# Patient Record
Sex: MALE | Race: WHITE | NOT HISPANIC OR LATINO | Employment: UNEMPLOYED | URBAN - METROPOLITAN AREA
[De-identification: names, ages, dates, MRNs, and addresses within clinical notes are randomized per-mention and may not be internally consistent; named-entity substitution may affect disease eponyms.]

---

## 2017-10-03 ENCOUNTER — HOSPITAL ENCOUNTER (INPATIENT)
Facility: MEDICAL CENTER | Age: 47
LOS: 1 days | DRG: 917 | End: 2017-10-05
Attending: EMERGENCY MEDICINE | Admitting: HOSPITALIST
Payer: MEDICAID

## 2017-10-03 DIAGNOSIS — F99 PSYCHIATRIC DISORDER: ICD-10-CM

## 2017-10-03 DIAGNOSIS — F15.10 METHAMPHETAMINE ABUSE (HCC): ICD-10-CM

## 2017-10-03 DIAGNOSIS — R41.0 DELIRIUM: ICD-10-CM

## 2017-10-03 LAB
ALBUMIN SERPL BCP-MCNC: 3.7 G/DL (ref 3.2–4.9)
ALBUMIN/GLOB SERPL: 1.2 G/DL
ALP SERPL-CCNC: 58 U/L (ref 30–99)
ALT SERPL-CCNC: 33 U/L (ref 2–50)
AMPHET UR QL SCN: POSITIVE
ANION GAP SERPL CALC-SCNC: 8 MMOL/L (ref 0–11.9)
APAP SERPL-MCNC: <10 UG/ML (ref 10–30)
APPEARANCE UR: CLEAR
AST SERPL-CCNC: 43 U/L (ref 12–45)
BARBITURATES UR QL SCN: NEGATIVE
BASOPHILS # BLD AUTO: 0.4 % (ref 0–1.8)
BASOPHILS # BLD: 0.03 K/UL (ref 0–0.12)
BENZODIAZ UR QL SCN: POSITIVE
BILIRUB SERPL-MCNC: 1 MG/DL (ref 0.1–1.5)
BILIRUB UR QL STRIP.AUTO: NEGATIVE
BUN SERPL-MCNC: 19 MG/DL (ref 8–22)
BZE UR QL SCN: NEGATIVE
CALCIUM SERPL-MCNC: 8.6 MG/DL (ref 8.5–10.5)
CANNABINOIDS UR QL SCN: NEGATIVE
CHLORIDE SERPL-SCNC: 102 MMOL/L (ref 96–112)
CK SERPL-CCNC: 628 U/L (ref 0–154)
CO2 SERPL-SCNC: 27 MMOL/L (ref 20–33)
COLOR UR: YELLOW
CREAT SERPL-MCNC: 1.06 MG/DL (ref 0.5–1.4)
EOSINOPHIL # BLD AUTO: 0.17 K/UL (ref 0–0.51)
EOSINOPHIL NFR BLD: 2.1 % (ref 0–6.9)
ERYTHROCYTE [DISTWIDTH] IN BLOOD BY AUTOMATED COUNT: 39.4 FL (ref 35.9–50)
ETHANOL BLD-MCNC: 0 G/DL
GFR SERPL CREATININE-BSD FRML MDRD: >60 ML/MIN/1.73 M 2
GLOBULIN SER CALC-MCNC: 3.2 G/DL (ref 1.9–3.5)
GLUCOSE SERPL-MCNC: 106 MG/DL (ref 65–99)
GLUCOSE UR STRIP.AUTO-MCNC: NEGATIVE MG/DL
HCT VFR BLD AUTO: 34 % (ref 42–52)
HGB BLD-MCNC: 12.5 G/DL (ref 14–18)
IMM GRANULOCYTES # BLD AUTO: 0.03 K/UL (ref 0–0.11)
IMM GRANULOCYTES NFR BLD AUTO: 0.4 % (ref 0–0.9)
KETONES UR STRIP.AUTO-MCNC: NEGATIVE MG/DL
LEUKOCYTE ESTERASE UR QL STRIP.AUTO: NEGATIVE
LYMPHOCYTES # BLD AUTO: 1.93 K/UL (ref 1–4.8)
LYMPHOCYTES NFR BLD: 23.8 % (ref 22–41)
MCH RBC QN AUTO: 32.1 PG (ref 27–33)
MCHC RBC AUTO-ENTMCNC: 36.8 G/DL (ref 33.7–35.3)
MCV RBC AUTO: 87.4 FL (ref 81.4–97.8)
METHADONE UR QL SCN: NEGATIVE
MICRO URNS: NORMAL
MONOCYTES # BLD AUTO: 0.97 K/UL (ref 0–0.85)
MONOCYTES NFR BLD AUTO: 11.9 % (ref 0–13.4)
NEUTROPHILS # BLD AUTO: 4.99 K/UL (ref 1.82–7.42)
NEUTROPHILS NFR BLD: 61.4 % (ref 44–72)
NITRITE UR QL STRIP.AUTO: NEGATIVE
NRBC # BLD AUTO: 0 K/UL
NRBC BLD AUTO-RTO: 0 /100 WBC
OPIATES UR QL SCN: NEGATIVE
OXYCODONE UR QL SCN: NEGATIVE
PCP UR QL SCN: NEGATIVE
PH UR STRIP.AUTO: 6 [PH]
PLATELET # BLD AUTO: 253 K/UL (ref 164–446)
PMV BLD AUTO: 9.1 FL (ref 9–12.9)
POTASSIUM SERPL-SCNC: 3.3 MMOL/L (ref 3.6–5.5)
PROPOXYPH UR QL SCN: NEGATIVE
PROT SERPL-MCNC: 6.9 G/DL (ref 6–8.2)
PROT UR QL STRIP: NEGATIVE MG/DL
RBC # BLD AUTO: 3.89 M/UL (ref 4.7–6.1)
RBC UR QL AUTO: NEGATIVE
SALICYLATES SERPL-MCNC: 0 MG/DL (ref 15–25)
SODIUM SERPL-SCNC: 137 MMOL/L (ref 135–145)
SP GR UR STRIP.AUTO: 1
UROBILINOGEN UR STRIP.AUTO-MCNC: 2 MG/DL
WBC # BLD AUTO: 8.1 K/UL (ref 4.8–10.8)

## 2017-10-03 PROCEDURE — 81003 URINALYSIS AUTO W/O SCOPE: CPT

## 2017-10-03 PROCEDURE — 304561 HCHG STAT O2

## 2017-10-03 PROCEDURE — 700111 HCHG RX REV CODE 636 W/ 250 OVERRIDE (IP): Performed by: EMERGENCY MEDICINE

## 2017-10-03 PROCEDURE — 85025 COMPLETE CBC W/AUTO DIFF WBC: CPT

## 2017-10-03 PROCEDURE — 96374 THER/PROPH/DIAG INJ IV PUSH: CPT

## 2017-10-03 PROCEDURE — 80053 COMPREHEN METABOLIC PANEL: CPT

## 2017-10-03 PROCEDURE — 99291 CRITICAL CARE FIRST HOUR: CPT

## 2017-10-03 PROCEDURE — 80307 DRUG TEST PRSMV CHEM ANLYZR: CPT

## 2017-10-03 PROCEDURE — 93005 ELECTROCARDIOGRAM TRACING: CPT | Performed by: EMERGENCY MEDICINE

## 2017-10-03 PROCEDURE — 304562 HCHG STAT O2 MASK/CANNULA

## 2017-10-03 PROCEDURE — 82550 ASSAY OF CK (CPK): CPT

## 2017-10-03 RX ORDER — LORAZEPAM 2 MG/ML
2 INJECTION INTRAMUSCULAR ONCE
Status: DISCONTINUED | OUTPATIENT
Start: 2017-10-03 | End: 2017-10-03

## 2017-10-03 RX ORDER — LORAZEPAM 2 MG/ML
1 INJECTION INTRAMUSCULAR ONCE
Status: COMPLETED | OUTPATIENT
Start: 2017-10-03 | End: 2017-10-03

## 2017-10-03 RX ADMIN — LORAZEPAM 1 MG: 2 INJECTION INTRAMUSCULAR; INTRAVENOUS at 22:27

## 2017-10-04 ENCOUNTER — APPOINTMENT (OUTPATIENT)
Dept: RADIOLOGY | Facility: MEDICAL CENTER | Age: 47
DRG: 917 | End: 2017-10-04
Attending: EMERGENCY MEDICINE
Payer: MEDICAID

## 2017-10-04 ENCOUNTER — RESOLUTE PROFESSIONAL BILLING HOSPITAL PROF FEE (OUTPATIENT)
Dept: HOSPITALIST | Facility: MEDICAL CENTER | Age: 47
End: 2017-10-04
Payer: MEDICAID

## 2017-10-04 PROBLEM — R41.0 ACUTE DELIRIUM: Status: ACTIVE | Noted: 2017-10-04

## 2017-10-04 PROBLEM — T43.651A: Status: ACTIVE | Noted: 2017-10-04

## 2017-10-04 PROCEDURE — 700102 HCHG RX REV CODE 250 W/ 637 OVERRIDE(OP): Performed by: HOSPITALIST

## 2017-10-04 PROCEDURE — 96376 TX/PRO/DX INJ SAME DRUG ADON: CPT

## 2017-10-04 PROCEDURE — A9270 NON-COVERED ITEM OR SERVICE: HCPCS | Performed by: HOSPITALIST

## 2017-10-04 PROCEDURE — 99223 1ST HOSP IP/OBS HIGH 75: CPT | Performed by: HOSPITALIST

## 2017-10-04 PROCEDURE — 770001 HCHG ROOM/CARE - MED/SURG/GYN PRIV*

## 2017-10-04 PROCEDURE — 96375 TX/PRO/DX INJ NEW DRUG ADDON: CPT

## 2017-10-04 PROCEDURE — 70450 CT HEAD/BRAIN W/O DYE: CPT

## 2017-10-04 PROCEDURE — 700111 HCHG RX REV CODE 636 W/ 250 OVERRIDE (IP): Performed by: EMERGENCY MEDICINE

## 2017-10-04 RX ORDER — AMOXICILLIN 250 MG
2 CAPSULE ORAL 2 TIMES DAILY
Status: DISCONTINUED | OUTPATIENT
Start: 2017-10-04 | End: 2017-10-05 | Stop reason: HOSPADM

## 2017-10-04 RX ORDER — DIPHENHYDRAMINE HYDROCHLORIDE 50 MG/ML
50 INJECTION INTRAMUSCULAR; INTRAVENOUS ONCE
Status: COMPLETED | OUTPATIENT
Start: 2017-10-04 | End: 2017-10-04

## 2017-10-04 RX ORDER — LORAZEPAM 2 MG/ML
2 INJECTION INTRAMUSCULAR ONCE
Status: COMPLETED | OUTPATIENT
Start: 2017-10-04 | End: 2017-10-04

## 2017-10-04 RX ORDER — CLONIDINE HYDROCHLORIDE 0.1 MG/1
0.1 TABLET ORAL EVERY 6 HOURS PRN
Status: DISCONTINUED | OUTPATIENT
Start: 2017-10-04 | End: 2017-10-05 | Stop reason: HOSPADM

## 2017-10-04 RX ORDER — POLYETHYLENE GLYCOL 3350 17 G/17G
1 POWDER, FOR SOLUTION ORAL
Status: DISCONTINUED | OUTPATIENT
Start: 2017-10-04 | End: 2017-10-05 | Stop reason: HOSPADM

## 2017-10-04 RX ORDER — RISPERIDONE 1 MG/1
2 TABLET ORAL 2 TIMES DAILY
Status: DISCONTINUED | OUTPATIENT
Start: 2017-10-04 | End: 2017-10-05 | Stop reason: HOSPADM

## 2017-10-04 RX ORDER — LORAZEPAM 2 MG/ML
2 INJECTION INTRAMUSCULAR
Status: DISCONTINUED | OUTPATIENT
Start: 2017-10-04 | End: 2017-10-05 | Stop reason: HOSPADM

## 2017-10-04 RX ORDER — LORAZEPAM 1 MG/1
2 TABLET ORAL EVERY 4 HOURS PRN
Status: DISCONTINUED | OUTPATIENT
Start: 2017-10-04 | End: 2017-10-05 | Stop reason: HOSPADM

## 2017-10-04 RX ORDER — FLUOXETINE HYDROCHLORIDE 20 MG/1
20 CAPSULE ORAL DAILY
Status: DISCONTINUED | OUTPATIENT
Start: 2017-10-04 | End: 2017-10-05 | Stop reason: HOSPADM

## 2017-10-04 RX ORDER — LABETALOL HYDROCHLORIDE 5 MG/ML
10 INJECTION, SOLUTION INTRAVENOUS EVERY 4 HOURS PRN
Status: DISCONTINUED | OUTPATIENT
Start: 2017-10-04 | End: 2017-10-05 | Stop reason: HOSPADM

## 2017-10-04 RX ORDER — HALOPERIDOL 5 MG/ML
5 INJECTION INTRAMUSCULAR EVERY 4 HOURS PRN
Status: DISCONTINUED | OUTPATIENT
Start: 2017-10-04 | End: 2017-10-05 | Stop reason: HOSPADM

## 2017-10-04 RX ORDER — BISACODYL 10 MG
10 SUPPOSITORY, RECTAL RECTAL
Status: DISCONTINUED | OUTPATIENT
Start: 2017-10-04 | End: 2017-10-05 | Stop reason: HOSPADM

## 2017-10-04 RX ORDER — POTASSIUM CHLORIDE 20 MEQ/1
40 TABLET, EXTENDED RELEASE ORAL ONCE
Status: COMPLETED | OUTPATIENT
Start: 2017-10-04 | End: 2017-10-04

## 2017-10-04 RX ADMIN — LORAZEPAM 2 MG: 2 INJECTION INTRAMUSCULAR; INTRAVENOUS at 03:33

## 2017-10-04 RX ADMIN — RISPERIDONE 2 MG: 1 TABLET, FILM COATED ORAL at 20:12

## 2017-10-04 RX ADMIN — RISPERIDONE 2 MG: 1 TABLET, FILM COATED ORAL at 09:00

## 2017-10-04 RX ADMIN — DIPHENHYDRAMINE HYDROCHLORIDE 50 MG: 50 INJECTION, SOLUTION INTRAMUSCULAR; INTRAVENOUS at 01:55

## 2017-10-04 RX ADMIN — POTASSIUM CHLORIDE 40 MEQ: 1500 TABLET, EXTENDED RELEASE ORAL at 10:46

## 2017-10-04 RX ADMIN — FLUOXETINE HYDROCHLORIDE 20 MG: 20 CAPSULE ORAL at 09:00

## 2017-10-04 RX ADMIN — STANDARDIZED SENNA CONCENTRATE AND DOCUSATE SODIUM 2 TABLET: 8.6; 5 TABLET, FILM COATED ORAL at 20:12

## 2017-10-04 ASSESSMENT — PAIN SCALES - GENERAL
PAINLEVEL_OUTOF10: 0
PAINLEVEL_OUTOF10: 0

## 2017-10-04 NOTE — H&P
Hospital Medicine History and Physical    Date of Service  10/4/2017    Chief Complaint  Chief Complaint   Patient presents with   • Drug Ingestion     Pt admits to taking either crack, cocaine, or meth       History of Presenting Illness  47 y.o. male who presented 10/3/2017 with agitation apparently due to methamphetamine intoxication. The history is obtained from the chart and the emergency department physician the patient is unable to give me any information. Apparently he is visiting here to celebrate his birthday, he told EMS that his girlfriend gave him a stimulant. He apparently had shaking and was concerned about seizure activity and called EMS. He had a glass pipe with him with dried residue in it. Patient now is agitated and moaning crying out and rolling his eyes back in his head. He does not have any rhythmic activity associated with seizures. He is intermittently able to answer questions and follow commands.   It is unknown whether he is taking his psychiatric medications.   Primary Care Physician  No primary care provider on file.    Consultants  None    Code Status  Full code    Review of Systems  Review of Systems   Unable to perform ROS: Acuity of condition        Past Medical History  Past Medical History:   Diagnosis Date   • Psychiatric disorder     Schizophrenia   • Psychiatric disorder     Depression   • Psychiatric disorder     Bipolar       Surgical History  No past surgical history on file.    Medications  FLUoxetine HCl (PROZAC PO)  Take  by mouth.             RISPERIDONE PO  Take  by mouth.             ValACYclovir HCl (VALTREX PO)  Take  by mouth.             Ziprasidone HCl (GEODON PO)  Take  by mouth.                   Family History  Family History   Problem Relation Age of Onset   • Family history unknown: Yes       Social History  Social History   Substance Use Topics   • Smoking status: Unknown If Ever Smoked   • Smokeless tobacco: Not on file   • Alcohol use Not on file        Allergies  No Known Allergies     Physical Exam  Laboratory   Hemodynamics  No data recorded.      Pulse  Av.9  Min: 80  Max: 96 Heart Rate (Monitored): 81  NIBP: 132/85      Respiratory      Respiration: 19, Pulse Oximetry: 99 %             Physical Exam   Constitutional: He appears well-developed and well-nourished. He appears toxic. He appears distressed.   HENT:   Nose: Nose normal.   Mouth/Throat: Oropharynx is clear and moist. No oropharyngeal exudate.   Eyes: Conjunctivae are normal. Right eye exhibits no discharge. Left eye exhibits no discharge. No scleral icterus.   Neck: No JVD present. No tracheal deviation present.   Cardiovascular: Normal rate, regular rhythm and normal heart sounds.    Pulmonary/Chest: Effort normal and breath sounds normal. No stridor. No respiratory distress. He has no wheezes. He has no rales. He exhibits no tenderness.   Abdominal: Soft. Bowel sounds are normal. He exhibits no distension. There is no tenderness.   Musculoskeletal: He exhibits no edema or tenderness.   Neurological: He is alert. No cranial nerve deficit. He exhibits normal muscle tone. He displays no seizure activity.   Skin: Skin is warm and dry. He is not diaphoretic. No pallor.   Psychiatric: His affect is labile. He is agitated and hyperactive. He expresses impulsivity.   Nursing note and vitals reviewed.      Recent Labs      10/03/17   2220   WBC  8.1   RBC  3.89*   HEMOGLOBIN  12.5*   HEMATOCRIT  34.0*   MCV  87.4   MCH  32.1   MCHC  36.8*   RDW  39.4   PLATELETCT  253   MPV  9.1     Recent Labs      10/03/17   2220   SODIUM  137   POTASSIUM  3.3*   CHLORIDE  102   CO2  27   GLUCOSE  106*   BUN  19   CREATININE  1.06   CALCIUM  8.6     Recent Labs      10/03/17   2220   ALTSGPT  33   ASTSGOT  43   ALKPHOSPHAT  58   TBILIRUBIN  1.0   GLUCOSE  106*                 No results found for: TROPONINI  Urinalysis:    Lab Results  Component Value Date/Time   SPECGRAVITY 1.005 10/03/2017 2220   GLUCOSEUR  Negative 10/03/2017 2220   KETONES Negative 10/03/2017 2220   NITRITE Negative 10/03/2017 2220        Imaging  CT of head:   1.  No acute intracranial abnormality.  2.  Mildly comminuted bilateral nasal bone fractures, age indeterminant  3.  Atherosclerosis.   Assessment/Plan     I anticipate this patient will require at least two midnights for appropriate medical management, necessitating inpatient admission.    Psychiatric disorder- (present on admission)   Assessment & Plan    Patient had listed a history of schizophrenia bipolar disorder and depression.  Continue Prozac and Risperdal at this time.        Poisoning by methamphetamines- (present on admission)   Assessment & Plan    Admit to ICU due to severity of agitation.  Ativan as needed IV to reduce agitation; haldol prn as an adjunct if needed to control behavior.  Check CPK, rhabdomyolysis can develop in this situation.  Keep patient as calm as possible and sedate to avoid self harm  At this point he is hemodynamically stable and able to protect his airway and not having seizures.  Continue risperdal and prozac as on his med list.              VTE prophylaxis:SCDs .

## 2017-10-04 NOTE — PROGRESS NOTES
Patient admitted at 0500, skin assessment performed by myself and Arturo Almeida.    No pressure ulcers identified.

## 2017-10-04 NOTE — ASSESSMENT & PLAN NOTE
Patient had listed a history of schizophrenia bipolar disorder and depression.  Continue Prozac and Risperdal  Add prn haldol but not needed presently

## 2017-10-04 NOTE — ED NOTES
[facility]  Pt admits to taking either crack, cocaine, or meth.  Pt has uncontrollable movements and is difficulty to understand.  Pt AOx4 and cooperative.

## 2017-10-04 NOTE — PROGRESS NOTES
Med Rec completed per patient at bedside.  Allergies reviewed   No antibiotics within the last 30 days.    Patient has not taken any medication in 2 weeks.  Patient also stated he does not have a pharmacy.

## 2017-10-04 NOTE — ASSESSMENT & PLAN NOTE
Ativan as needed IV to control agitation; haldol prn as an adjunct if needed to control behavior.  Trend CPK  Continue risperdal and prozac as on his med list.

## 2017-10-04 NOTE — PROGRESS NOTES
[facility] Hospitalist Progress Note    Date of Service: 10/4/2017    Chief Complaint  47 y.o. male admitted 10/3/2017 with encephalopathy 2/2 Meth toxicity.     Interval Problem Update  Pleasant/sleepy  Alert to self and place only  In nsr in 80s  Bp stable  No bm yet  No baires  No central line  Feet are necrotic appearing  On room air          Consultants/Specialty  none      Disposition  Ok for floor        Review of Systems   Unable to perform ROS: Critical illness      Physical Exam  Laboratory/Imaging   Hemodynamics  Temp (24hrs), Av.7 °C (98 °F), Min:36.7 °C (98 °F), Max:36.7 °C (98 °F)   Temperature: 36.7 °C (98 °F)  Pulse  Av.1  Min: 80  Max: 96 Heart Rate (Monitored): 90  Blood Pressure: 149/75, NIBP: 120/57      Respiratory      Respiration: 16, Pulse Oximetry: 95 %             Fluids    Intake/Output Summary (Last 24 hours) at 10/04/17 0734  Last data filed at 10/04/17 0500   Gross per 24 hour   Intake                0 ml   Output              500 ml   Net             -500 ml       Nutrition  Orders Placed This Encounter   Procedures   • Diet NPO     Standing Status:   Standing     Number of Occurrences:   1     Order Specific Question:   Restrict to:     Answer:   Sips with Medications [3]     Physical Exam   Constitutional: He appears well-developed and well-nourished. He appears lethargic. No distress.   HENT:   Right Ear: External ear normal.   Left Ear: External ear normal.   Nose: Nose normal.   Eyes: Right eye exhibits no discharge. Left eye exhibits no discharge. No scleral icterus.   Neck: No JVD present. No tracheal deviation present.   Cardiovascular: Normal rate, normal heart sounds and intact distal pulses.    No murmur heard.  Pulmonary/Chest: Effort normal and breath sounds normal. No respiratory distress. He has no wheezes. He has no rales.   Abdominal: Soft. Bowel sounds are normal. He exhibits no distension. There is no tenderness. There is no guarding.   Musculoskeletal: He  exhibits no edema or tenderness.   Neurological: He appears lethargic. He displays no tremor.   Will respond to questions but will not follow commands. Does not appear focal. Moving all 4 extremities.    Skin: Skin is warm and dry. He is not diaphoretic. No erythema.   Psychiatric: He has a normal mood and affect. His behavior is normal.   Nursing note and vitals reviewed.      Recent Labs      10/03/17   2220   WBC  8.1   RBC  3.89*   HEMOGLOBIN  12.5*   HEMATOCRIT  34.0*   MCV  87.4   MCH  32.1   MCHC  36.8*   RDW  39.4   PLATELETCT  253   MPV  9.1     Recent Labs      10/03/17   2220   SODIUM  137   POTASSIUM  3.3*   CHLORIDE  102   CO2  27   GLUCOSE  106*   BUN  19   CREATININE  1.06   CALCIUM  8.6                      Assessment/Plan     Acute delirium   Assessment & Plan    2/2 amphetamine OD. Plan as above.         Psychiatric disorder- (present on admission)   Assessment & Plan    Patient had listed a history of schizophrenia bipolar disorder and depression.  Continue Prozac and Risperdal  Add prn haldol but not needed presently         Poisoning by methamphetamines- (present on admission)   Assessment & Plan    Ativan as needed IV to control agitation; haldol prn as an adjunct if needed to control behavior.  Trend CPK  Continue risperdal and prozac as on his med list.            Quality-Core Measures

## 2017-10-04 NOTE — CARE PLAN
Problem: Safety  Goal: Will remain free from injury  Outcome: PROGRESSING AS EXPECTED  Patient bed alarm on. Educated to ask for help when wanting to get out of bed.     Problem: Knowledge Deficit  Goal: Knowledge of disease process/condition, treatment plan, diagnostic tests, and medications will improve  Outcome: PROGRESSING SLOWER THAN EXPECTED  Patient very groggy when educating about illness this AM. Will try again later in afternoon.

## 2017-10-04 NOTE — DISCHARGE PLANNING
[facility] attempted to screen pt, but was unsuccessful due to pt repeatedly falling asleep while speaking. No family contact number listed. Will revisit tomorrow.

## 2017-10-04 NOTE — ED PROVIDER NOTES
ED Provider Note    Scribed for Mehran Wilde M.D. by Joshua Flower. 10/3/2017, 8:37 PM.    Means of arrival: EMS  History obtained from: EMS and Nursing  History limited by: Altered mental status    CHIEF COMPLAINT  Chief Complaint   Patient presents with   • Drug Ingestion     Pt admits to taking either crack, cocaine, or meth       HPI  Marlon Garces is a 47 y.o. Male with a history of depression, bipolar disorder, and schizophrenia who presents to the Emergency Department for evaluation following illicit drug ingestion. Patient is from out of town and was celebrating a birthday this evening.  His girlfriend gave him a stimulant which he promptly used. Patient told EMS unsure if the drug was meth, crack, or cocaine.  He then began to experience seizure-like activity. Patient was capable of ambulating on scene. Patient had a glass pipe with residue in a cigarette box with his possessions.  No further details of history of present illness can be obtained within the constraints of urgency of the patient's clinical condition.    REVIEW OF SYSTEMS  See HPI, unable to obtain   PAST MEDICAL HISTORY   has a past medical history of Psychiatric disorder; Psychiatric disorder; and Psychiatric disorder.unable to obtain     SURGICAL HISTORY  patient denies any surgical history    SOCIAL HISTORY  Social History   Substance Use Topics   • Smoking status: Unknown If Ever Smoked   • Smokeless tobacco: Not on file   • Alcohol use Not on file      History   Drug Use   • Types: Methamphetamines       FAMILY HISTORY  Family History   Problem Relation Age of Onset   • Family history unknown: Yes       CURRENT MEDICATIONS  Reviewed.  See Encounter Summary.     ALLERGIES  No Known Allergies    PHYSICAL EXAM  VITAL SIGNS: Pulse 96   Resp 12   Wt 90.7 kg (200 lb)   SpO2 96%   Constitutional: Awake, Moaning, grimacing, intermittently flexing shoulders and hands, some repetitive lipsmacking motions.   HENT: Normocephalic,  "atraumatic, Bilateral external ears normal. Nose normal.   Eyes: Conjunctiva normal, non-icteric, EOMI.    Thorax & Lungs: Easy unlabored respirations, Clear to ascultation bilaterally.  Cardiovascular: Regular rate, Regular rhythm, No murmurs, rubs or gallops.  Abdomen:  Soft, nontender, no masses   Skin: Visualized skin is  Dry, No erythema, No rash.   Extremities:   No cyanosis, clubbing or edema.  Neurologic: Alert, pupils 2 mm. Moving all 4 extremities. Patient responds to simple questions.  Psychiatric: Unable to assess    DIAGNOSTIC STUDIES / PROCEDURES     EKG  Interpreted by me    Rhythm:  Normal sinus rhythm   Rate: 94  Axis: normal  Ectopy: none  Conduction: normal  ST Segments: no acute change  T Waves: no acute change  Q Waves: none  QTc 486  Clinical Impression: Normal EKG without acute changes     RADIOLOGY  CT-HEAD W/O   Final Result         1.  No acute intracranial abnormality.   2.  Mildly comminuted bilateral nasal bone fractures, age indeterminant   3.  Atherosclerosis.        The radiologist's interpretation of all radiological studies have been reviewed by me.    COURSE & MEDICAL DECISION MAKING  Pertinent Labs & Imaging studies reviewed. (See chart for details)    8:36 PM I reviewed the patient's EKG, as outlined above.    8:37 PM - Patient seen and examined at bedside. Patient will be treated with Ativan injection 2 mg. Ordered EKG, Creatinine Kinase, urine drug screen, Blood alcohol, CBC with differential, CMP, U/A, Acetominophen level, and salicylate to evaluate his symptoms.     9:43 PM Recheck: Patient re-evaluated at beside. Patient responded well to Ativan.    11:50 PM Review of urine drug test results reveal amphetamines.    1:58 AM Recheck: Patient re-evaluated at beside. Patient is stating, \"it hurts\" and pointing to his lower back.    3 AM-  discussed the case with Dr. Schmid.    3:32 AM- reevaluated the patient, he is sitting up and drinking a glass of water. He does responds to " questions. He is gradually more alert but certainly not back to baseline. He intermittently moans and has what appears to be non-voluntary movements.      Decision Making:  This is a 47 y.o. year old male who presents with altered mental status. I suspect the etiology is methamphetamine abuse. The history is unclear initially the patient was quite altered. He was observed for several hours in the [facility] department with gradual improvement of his mental status. He is certainly not back to baseline at this time and will need to be observed further. CT head is unremarkable, his significant artifact but no evidence of intracranial mass or intracranial hemorrhage. He does not have any electrolyte abnormalities, leukocytosis or evidence of sepsis.    At this point he will be admitted, I have given him Ativan for sedation.    The patient will be admitted in stable condition.    FINAL IMPRESSION  1. Methamphetamine abuse    2. Delirium    3. Psychiatric disorder          Joshua WEAVER (Scribe), am scribing for, and in the presence of, Mehran Wilde M.D..    Electronically signed by: Joshua Flower (Scribe), 10/3/2017    Mehran WEAVER M.D. personally performed the services described in this documentation, as scribed by Joshua Flower in my presence, and it is both accurate and complete.    The note accurately reflects work and decisions made by me.  Mehran Wilde  10/4/2017  4:47 AM

## 2017-10-05 VITALS
TEMPERATURE: 97.7 F | DIASTOLIC BLOOD PRESSURE: 75 MMHG | SYSTOLIC BLOOD PRESSURE: 149 MMHG | OXYGEN SATURATION: 99 % | WEIGHT: 200 LBS | RESPIRATION RATE: 15 BRPM | HEART RATE: 89 BPM

## 2017-10-05 LAB
ALBUMIN SERPL BCP-MCNC: 3.4 G/DL (ref 3.2–4.9)
ALBUMIN/GLOB SERPL: 1.1 G/DL
ALP SERPL-CCNC: 58 U/L (ref 30–99)
ALT SERPL-CCNC: 22 U/L (ref 2–50)
ANION GAP SERPL CALC-SCNC: 8 MMOL/L (ref 0–11.9)
AST SERPL-CCNC: 24 U/L (ref 12–45)
BASOPHILS # BLD AUTO: 0.4 % (ref 0–1.8)
BASOPHILS # BLD: 0.02 K/UL (ref 0–0.12)
BILIRUB SERPL-MCNC: 0.7 MG/DL (ref 0.1–1.5)
BUN SERPL-MCNC: 13 MG/DL (ref 8–22)
CALCIUM SERPL-MCNC: 8.9 MG/DL (ref 8.5–10.5)
CHLORIDE SERPL-SCNC: 103 MMOL/L (ref 96–112)
CK SERPL-CCNC: 153 U/L (ref 0–154)
CO2 SERPL-SCNC: 26 MMOL/L (ref 20–33)
CREAT SERPL-MCNC: 0.88 MG/DL (ref 0.5–1.4)
EOSINOPHIL # BLD AUTO: 0.15 K/UL (ref 0–0.51)
EOSINOPHIL NFR BLD: 2.7 % (ref 0–6.9)
ERYTHROCYTE [DISTWIDTH] IN BLOOD BY AUTOMATED COUNT: 39.4 FL (ref 35.9–50)
GFR SERPL CREATININE-BSD FRML MDRD: >60 ML/MIN/1.73 M 2
GLOBULIN SER CALC-MCNC: 3.2 G/DL (ref 1.9–3.5)
GLUCOSE SERPL-MCNC: 97 MG/DL (ref 65–99)
HCT VFR BLD AUTO: 35.7 % (ref 42–52)
HGB BLD-MCNC: 12.9 G/DL (ref 14–18)
IMM GRANULOCYTES # BLD AUTO: 0.02 K/UL (ref 0–0.11)
IMM GRANULOCYTES NFR BLD AUTO: 0.4 % (ref 0–0.9)
LYMPHOCYTES # BLD AUTO: 1.7 K/UL (ref 1–4.8)
LYMPHOCYTES NFR BLD: 30.4 % (ref 22–41)
MAGNESIUM SERPL-MCNC: 2.2 MG/DL (ref 1.5–2.5)
MCH RBC QN AUTO: 31.9 PG (ref 27–33)
MCHC RBC AUTO-ENTMCNC: 36.1 G/DL (ref 33.7–35.3)
MCV RBC AUTO: 88.1 FL (ref 81.4–97.8)
MONOCYTES # BLD AUTO: 0.63 K/UL (ref 0–0.85)
MONOCYTES NFR BLD AUTO: 11.3 % (ref 0–13.4)
NEUTROPHILS # BLD AUTO: 3.07 K/UL (ref 1.82–7.42)
NEUTROPHILS NFR BLD: 54.8 % (ref 44–72)
NRBC # BLD AUTO: 0 K/UL
NRBC BLD AUTO-RTO: 0 /100 WBC
PHOSPHATE SERPL-MCNC: 4.3 MG/DL (ref 2.5–4.5)
PLATELET # BLD AUTO: 247 K/UL (ref 164–446)
PMV BLD AUTO: 9.3 FL (ref 9–12.9)
POTASSIUM SERPL-SCNC: 3.6 MMOL/L (ref 3.6–5.5)
PROT SERPL-MCNC: 6.6 G/DL (ref 6–8.2)
RBC # BLD AUTO: 4.05 M/UL (ref 4.7–6.1)
SODIUM SERPL-SCNC: 137 MMOL/L (ref 135–145)
WBC # BLD AUTO: 5.6 K/UL (ref 4.8–10.8)

## 2017-10-05 PROCEDURE — 700102 HCHG RX REV CODE 250 W/ 637 OVERRIDE(OP): Performed by: HOSPITALIST

## 2017-10-05 PROCEDURE — 700111 HCHG RX REV CODE 636 W/ 250 OVERRIDE (IP): Performed by: HOSPITALIST

## 2017-10-05 PROCEDURE — 85025 COMPLETE CBC W/AUTO DIFF WBC: CPT

## 2017-10-05 PROCEDURE — 83735 ASSAY OF MAGNESIUM: CPT

## 2017-10-05 PROCEDURE — 82550 ASSAY OF CK (CPK): CPT

## 2017-10-05 PROCEDURE — 90732 PPSV23 VACC 2 YRS+ SUBQ/IM: CPT | Performed by: HOSPITALIST

## 2017-10-05 PROCEDURE — 84100 ASSAY OF PHOSPHORUS: CPT

## 2017-10-05 PROCEDURE — 90471 IMMUNIZATION ADMIN: CPT

## 2017-10-05 PROCEDURE — A9270 NON-COVERED ITEM OR SERVICE: HCPCS | Performed by: HOSPITALIST

## 2017-10-05 PROCEDURE — 99239 HOSP IP/OBS DSCHRG MGMT >30: CPT | Performed by: HOSPITALIST

## 2017-10-05 PROCEDURE — 90686 IIV4 VACC NO PRSV 0.5 ML IM: CPT | Performed by: HOSPITALIST

## 2017-10-05 PROCEDURE — 3E0234Z INTRODUCTION OF SERUM, TOXOID AND VACCINE INTO MUSCLE, PERCUTANEOUS APPROACH: ICD-10-PCS | Performed by: HOSPITALIST

## 2017-10-05 PROCEDURE — 80053 COMPREHEN METABOLIC PANEL: CPT

## 2017-10-05 RX ORDER — POTASSIUM CHLORIDE 20 MEQ/1
40 TABLET, EXTENDED RELEASE ORAL ONCE
Status: COMPLETED | OUTPATIENT
Start: 2017-10-05 | End: 2017-10-05

## 2017-10-05 RX ADMIN — RISPERIDONE 2 MG: 1 TABLET, FILM COATED ORAL at 08:28

## 2017-10-05 RX ADMIN — PNEUMOCOCCAL VACCINE POLYVALENT 25 MCG
25; 25; 25; 25; 25; 25; 25; 25; 25; 25; 25; 25; 25; 25; 25; 25; 25; 25; 25; 25; 25; 25; 25 INJECTION, SOLUTION INTRAMUSCULAR; SUBCUTANEOUS at 11:21

## 2017-10-05 RX ADMIN — INFLUENZA A VIRUS A/MICHIGAN/45/2015 X-275 (H1N1) ANTIGEN (FORMALDEHYDE INACTIVATED), INFLUENZA A VIRUS A/HONG KONG/4801/2014 X-263B (H3N2) ANTIGEN (FORMALDEHYDE INACTIVATED), INFLUENZA B VIRUS B/PHUKET/3073/2013 ANTIGEN (FORMALDEHYDE INACTIVATED), AND INFLUENZA B VIRUS B/BRISBANE/60/2008 ANTIGEN (FORMALDEHYDE INACTIVATED) 0.5 ML: 15; 15; 15; 15 INJECTION, SUSPENSION INTRAMUSCULAR at 11:22

## 2017-10-05 RX ADMIN — STANDARDIZED SENNA CONCENTRATE AND DOCUSATE SODIUM 2 TABLET: 8.6; 5 TABLET, FILM COATED ORAL at 08:28

## 2017-10-05 RX ADMIN — FLUOXETINE HYDROCHLORIDE 20 MG: 20 CAPSULE ORAL at 08:28

## 2017-10-05 RX ADMIN — POTASSIUM CHLORIDE 40 MEQ: 1500 TABLET, EXTENDED RELEASE ORAL at 08:28

## 2017-10-05 ASSESSMENT — LIFESTYLE VARIABLES
CONSUMPTION TOTAL: POSITIVE
ON A TYPICAL DAY WHEN YOU DRINK ALCOHOL HOW MANY DRINKS DO YOU HAVE: 4
ALCOHOL_USE: YES
HOW MANY TIMES IN THE PAST YEAR HAVE YOU HAD 5 OR MORE DRINKS IN A DAY: 1
TOTAL SCORE: 3
TOTAL SCORE: 3
AVERAGE NUMBER OF DAYS PER WEEK YOU HAVE A DRINK CONTAINING ALCOHOL: 1
TOTAL SCORE: 3
DOES PATIENT WANT TO STOP DRINKING: NO
EVER FELT BAD OR GUILTY ABOUT YOUR DRINKING: YES
HAVE YOU EVER FELT YOU SHOULD CUT DOWN ON YOUR DRINKING: YES
EVER HAD A DRINK FIRST THING IN THE MORNING TO STEADY YOUR NERVES TO GET RID OF A HANGOVER: NO
EVER_SMOKED: YES
HAVE PEOPLE ANNOYED YOU BY CRITICIZING YOUR DRINKING: YES

## 2017-10-05 ASSESSMENT — PAIN SCALES - GENERAL: PAINLEVEL_OUTOF10: 0

## 2017-10-05 ASSESSMENT — PATIENT HEALTH QUESTIONNAIRE - PHQ9
2. FEELING DOWN, DEPRESSED, IRRITABLE, OR HOPELESS: NOT AT ALL
SUM OF ALL RESPONSES TO PHQ9 QUESTIONS 1 AND 2: 0
SUM OF ALL RESPONSES TO PHQ QUESTIONS 1-9: 0
1. LITTLE INTEREST OR PLEASURE IN DOING THINGS: NOT AT ALL

## 2017-10-05 NOTE — CARE PLAN
Problem: Safety  Goal: Will remain free from falls  Outcome: PROGRESSING AS EXPECTED  Call bell within reach. Bed alarm activated    Problem: Bowel/Gastric:  Goal: Normal bowel function is maintained or improved  Outcome: PROGRESSING AS EXPECTED  Stool softeners given

## 2017-10-05 NOTE — PROGRESS NOTES
12 hour chart check complete    Patient in ST 0.16/0.10/0.38    Patient sleeping most of day. Stated that he has been partying for a week straight and hasnt had a lot of sleep. Wakes up intermittently, and eats meals but falls asleep when RN tries to ask admission questions. Will continue to try to complete admission documentation, immunizations, and screenings. Downgraded to medr status.

## 2017-10-05 NOTE — DISCHARGE PLANNING
The patient is to discharge to a shelter today.  I gave him a shelter list and a bus pass.  I told him where to catch the bus on [facility].  He said that he came by bus from [place] to [place] to see his son.  However, today he said he doesn't know how to contact his son and doesn't know his son's phone number.  He plans to stay in [place], possibly at a hotel downtown.  He said that he gets social security at an address in [place] but didn't seem to be too worried about it. The nurse is getting him some clothes to wear and she checked the ER to see if his ID and other cards are there but the ER noted that they were placed in his bag.  The bag is here is the room.  Also, he said he is missing his partial denture but apparently it is not in the ER and he has checked his bed several times as well as his bag.  It is possible he didn't have the denture when he came in.

## 2017-10-05 NOTE — PROGRESS NOTES
Assumed pt care @ 1900. Received bedside shift report. Pt sleeping at this time. No acute distress. Will cont to monitor.

## 2017-10-06 NOTE — DISCHARGE SUMMARY
CHIEF COMPLAINT ON ADMISSION  Chief Complaint   Patient presents with   • Drug Ingestion     Pt admits to taking either crack, cocaine, or meth       CODE STATUS  Prior    HPI & HOSPITAL COURSE  This is a 47 y.o. male here with acute metabolic encephalopathy 2/2 methamphetamine toxicity. He was admitted to the icu with concern for worsening rhabdomyolysis. He improved without further complication and his mental status clears. He was counseled about his drug use and encouraged to abstain from amphetamine use.     Therefore, he is discharged in good and stable condition with close outpatient follow-up.    SPECIFIC OUTPATIENT FOLLOW-UP  PCP as needed     DISCHARGE PROBLEM LIST  Active Problems:    Poisoning by methamphetamines POA: Yes    Psychiatric disorder POA: Yes      Overview: Schizophrenia    Acute delirium POA: Unknown  Resolved Problems:    * No resolved hospital problems. *      FOLLOW UP  No future appointments.  No follow-up provider specified.    MEDICATIONS ON DISCHARGE  There are no discharge medications for this patient.       DIET  No orders of the defined types were placed in this encounter.      ACTIVITY  As tolerated.  Weight bearing as tolerated      CONSULTATIONS  none    PROCEDURES  none    LABORATORY  Lab Results   Component Value Date/Time    SODIUM 137 10/05/2017 04:25 AM    POTASSIUM 3.6 10/05/2017 04:25 AM    CHLORIDE 103 10/05/2017 04:25 AM    CO2 26 10/05/2017 04:25 AM    GLUCOSE 97 10/05/2017 04:25 AM    BUN 13 10/05/2017 04:25 AM    CREATININE 0.88 10/05/2017 04:25 AM        Lab Results   Component Value Date/Time    WBC 5.6 10/05/2017 04:25 AM    HEMOGLOBIN 12.9 (L) 10/05/2017 04:25 AM    HEMATOCRIT 35.7 (L) 10/05/2017 04:25 AM    PLATELETCT 247 10/05/2017 04:25 AM        Total time of the discharge process exceeds 31 minutes

## 2017-11-17 LAB — EKG IMPRESSION: NORMAL

## 2018-02-08 ENCOUNTER — HOSPITAL ENCOUNTER (EMERGENCY)
Facility: MEDICAL CENTER | Age: 48
End: 2018-02-08
Attending: EMERGENCY MEDICINE
Payer: MEDICAID

## 2018-02-08 VITALS
DIASTOLIC BLOOD PRESSURE: 93 MMHG | RESPIRATION RATE: 20 BRPM | WEIGHT: 191.36 LBS | HEIGHT: 66 IN | HEART RATE: 96 BPM | BODY MASS INDEX: 30.75 KG/M2 | TEMPERATURE: 97.1 F | SYSTOLIC BLOOD PRESSURE: 156 MMHG | OXYGEN SATURATION: 94 %

## 2018-02-08 DIAGNOSIS — F19.10 SUBSTANCE ABUSE (HCC): ICD-10-CM

## 2018-02-08 LAB — POC BREATHALIZER: 0 PERCENT (ref 0–0.01)

## 2018-02-08 PROCEDURE — 302970 POC BREATHALIZER: Performed by: EMERGENCY MEDICINE

## 2018-02-08 PROCEDURE — 99284 EMERGENCY DEPT VISIT MOD MDM: CPT

## 2018-02-08 ASSESSMENT — LIFESTYLE VARIABLES
EVER FELT BAD OR GUILTY ABOUT YOUR DRINKING: YES
HAVE YOU EVER FELT YOU SHOULD CUT DOWN ON YOUR DRINKING: YES
DOES PATIENT WANT TO TALK TO SOMEONE ABOUT QUITTING: YES
TOTAL SCORE: 2
HAVE PEOPLE ANNOYED YOU BY CRITICIZING YOUR DRINKING: NO
DOES PATIENT WANT TO STOP DRINKING: YES
CONSUMPTION TOTAL: INCOMPLETE
EVER HAD A DRINK FIRST THING IN THE MORNING TO STEADY YOUR NERVES TO GET RID OF A HANGOVER: NO
DO YOU DRINK ALCOHOL: YES

## 2018-02-08 ASSESSMENT — PAIN SCALES - GENERAL
PAINLEVEL_OUTOF10: 0
PAINLEVEL_OUTOF10: 0

## 2018-02-09 ENCOUNTER — HOSPITAL ENCOUNTER (EMERGENCY)
Facility: MEDICAL CENTER | Age: 48
End: 2018-02-09
Attending: EMERGENCY MEDICINE
Payer: MEDICAID

## 2018-02-09 VITALS
HEIGHT: 66 IN | DIASTOLIC BLOOD PRESSURE: 73 MMHG | OXYGEN SATURATION: 98 % | SYSTOLIC BLOOD PRESSURE: 123 MMHG | RESPIRATION RATE: 18 BRPM | TEMPERATURE: 98.3 F | WEIGHT: 189.82 LBS | BODY MASS INDEX: 30.51 KG/M2 | HEART RATE: 88 BPM

## 2018-02-09 DIAGNOSIS — Z76.5 MALINGERING: ICD-10-CM

## 2018-02-09 DIAGNOSIS — Z91.199 PATIENT'S NONCOMPLIANCE WITH OTHER MEDICAL TREATMENT AND REGIMEN: ICD-10-CM

## 2018-02-09 DIAGNOSIS — F19.20 DRUG ADDICTION (HCC): ICD-10-CM

## 2018-02-09 PROCEDURE — 99283 EMERGENCY DEPT VISIT LOW MDM: CPT

## 2018-02-09 NOTE — DISCHARGE INSTRUCTIONS
Finding Treatment for Addiction  WHAT IS ADDICTION?  Addiction is a complex disease of the brain. It causes an uncontrollable (compulsive) need for a substance. You can be addicted to alcohol, illegal drugs, or prescription medicines such as painkillers. Addiction can also be a behavior, like gambling or shopping. The need for the drug or activity can become so strong that you think about it all the time. You can also become physically dependent on a substance.  Addiction can change the way your brain works. Because of these changes, getting more of whatever you are addicted to becomes the most important thing to you and feels better than other activities or relationships. Addiction can lead to changes in health, behavior, emotions, relationships, and choices that affect you and everyone around you.  HOW DO I KNOW IF I NEED TREATMENT FOR ADDICTION?  Addiction is a progressive disease. Without treatment, addiction can get worse. Living with addiction puts you at higher risk for injury, poor health, lost employment, loss of money, and even death.  You might need treatment for addiction if:  · You have tried to stop or cut down, but you cannot.  · Your addiction is causing physical health problems.  · You find it annoying that your friends and family are concerned about your alcohol or substance use.  · You feel guilty about substance abuse or a compulsive behavior.  · You have lied or tried to hide your addiction.  · You need a particular substance or activity to start your day or to calm down.  · You are getting in trouble at school, work, home, or with the police.  · You have done something illegal to support your addiction.  · You are running out of money because of your addiction.  · You have no time for anything other than your addiction.  WHAT TYPES OF TREATMENT ARE AVAILABLE?  The treatment program that is right for you will depend on many factors, including the type of addiction you have. Treatment programs  can be outpatient or inpatient. In an outpatient program, you live at home and go to work or school, but you also go to a clinic for treatment. With an inpatient program, you live and sleep at the program facility during treatment. After treatment, you might need a plan for support during recovery. Other treatment options include:   · Medicine.  ¨ Some addictions may be treated with prescription medicines.  ¨ You might also need medicine to treat anxiety or depression.  · Counseling and behavior therapy. Therapy can help individuals and families behave in healthier ways and relate more effectively.  · Support groups. Confidential group therapy, such as a 12-step program, can help individuals and families during treatment and recovery.  No single type of program is right for everyone. Many treatment programs involve a combination of education, counseling, and a 12-step, spiritually-based approach. Some treatment programs are government sponsored. They are geared for patients who do not have private insurance. Treatment programs can vary in many respects, such as:  · Cost and types of insurance that are accepted.  · Types of onsite medical services that are offered.  · Length of stay, setting, and size.  · Overall philosophy of treatment.  WHAT SHOULD I CONSIDER WHEN SELECTING A TREATMENT PROGRAM?  It is important to think about your individual requirements when selecting a treatment program. There are a number of things to consider, such as:  · If the program is certified by the appropriate government agency. Even private programs must be certified and employ certified professionals.  · If the program is covered by your insurance. If finances are a concern, the first call you should make is to your insurance company, if you have health insurance. Ask for a list of treatment programs that are in your network, and confirm any copayments and deductibles that you may have to pay.  ¨ If you do not have insurance, or if  you choose to attend a program that does not accept your insurance, discuss whether a payment plan can be set up.  · If treatment is available in languages other than English, if needed.  · If the program offers detoxification treatment, if needed.  · If 12-step meetings are held at the center or if transport is available for patients to attend meetings at other locations.  · If the program is professional, organized, and clean.  · If the program meets all of your needs, including physical and cultural needs.  · If the facility offers specific treatment for your particular addiction.  · If support continues to be offered after you have left the program.  · If your treatment plan is continually looked at to make sure you are receiving the right treatment at the right time.  · If mental health counseling is part of your treatment.  · If medicine is included in treatment, if needed.  · If your family is included in your treatment plan and if support is offered to them throughout the treatment process.  · How the treatment works to prevent relapse.  WHERE ELSE CAN I GET HELP?  · Your health care provider. Ask him or her to help you find addiction treatment. These discussions are confidential.  · The [place] on Alcoholism and Drug Dependence (NCADD). This group has information about treatment centers and programs for people who have an addiction and for family members.  ¨ The telephone number is 8-852-OXG-CALL (1-149.782.6730).  ¨ The website is https://ncadd.org/about-ncadd/our-affiliates  · The Substance Abuse and Mental Health Services Administration (SAMHSA). This group will help you find publicly funded treatment centers, help hotlines, and counseling services near you.  ¨ The telephone number is 1-081-721-HELP (1-642.249.6422).  ¨ The website is www.findtreatment.samhsa.gov  In countries outside of the [place] and [place], look in local directories for contact information for services in your area.     This  information is not intended to replace advice given to you by your health care provider. Make sure you discuss any questions you have with your health care provider.     Document Released: 11/16/2006 Document Revised: 01/08/2016 Document Reviewed: 10/06/2015  Elsevier Interactive Patient Education ©2016 Elsevier Inc.

## 2018-02-09 NOTE — ED PROVIDER NOTES
"ED Provider Note    Scribed for Chava Proctor M.D. by Nguyễn Zavala. 2/9/2018  9:19 AM    Primary care provider: Pcp Pt States None  Means of arrival: Walk in  History obtained from: Patient  History limited by: None    CHIEF COMPLAINT  Chief Complaint   Patient presents with   • Anxiety       HPI  Marlon Garces is a 47 y.o. male who presents to the Emergency Department for anxiety. Patient was seen here yesterday and given a cab voucher to [facility]. On exam, patient states [facility] \"would not take me.\" [facility] states [facility] wanted patient to sign in. However, patient refused to sign in for treatment and preferred to go to [facility] but came here instead. He does not report any other associated symptoms on exam. Negative SI/HI.      REVIEW OF SYSTEMS  Pertinent positives include anxiety.   Pertinent negatives include no SI, HI.    E    PAST MEDICAL HISTORY   has a past medical history of Psychiatric disorder; Psychiatric disorder; and Psychiatric disorder.    SURGICAL HISTORY  patient denies any surgical history    SOCIAL HISTORY  Social History   Substance Use Topics   • Smoking status: Smoker, Current Status Unknown     Types: Cigarettes      History   Drug Use   • Types: Methamphetamines       FAMILY HISTORY  Family History   Problem Relation Age of Onset   • Family history unknown: Yes       CURRENT MEDICATIONS  Reviewed.  See Encounter Summary.     ALLERGIES  No Known Allergies    PHYSICAL EXAM  VITAL SIGNS: /96   Pulse 95   Temp 37.2 °C (99 °F)   Resp 20   Ht 1.676 m (5' 6\")   Wt 86.1 kg (189 lb 13.1 oz)   SpO2 98%   BMI 30.64 kg/m²   Nursing note and vitals reviewed.  Constitutional: Somewhat anxious. No evidence of alcohol withdrawal.  HENT: Head is atraumatic. Oropharynx is moist.   Eyes: Conjunctivae are normal. Pupils are equal, round, and reactive to light.   Cardiovascular: Normal peripheral perfusion  Respiratory: No respiratory distress.   Musculoskeletal: " Normal range of motion.   Neurological: Alert. No focal deficits noted.  No tremor.   Skin: No rash.   Psych: Somewhat anxious. No SI/HI.      COURSE & MEDICAL DECISION MAKING  Nursing notes, VS, PMSFHx reviewed in chart.     Review of past medical records shows the patient was seen here yesterday and given a cab voucher to [facility].     9:19 AM - Patient seen and examined at bedside. [facility] wanted patient to sign in at their facility, however, patient refused to sign in and preferred to go to [facility] but came here instead. Patient will be discharged. Patient was encouraged to refrain from alcohol and drug use. Will give patient a resources sheet.     The patient presents today with a history of polysubstance abuse. I feel there is a significant difficulty in component of malingering. I do not feel that the patient has any acute medical complaints or illness. No evidence of injury. The patient will be discharged. He has previously been given resources for substance abuse treatment. These are once again provided.     The patient will return for new or worsening symptoms and is stable at the time of discharge.    The patient is referred to a primary physician for blood pressure management, diabetic screening, and for all other preventative health concerns.      DISPOSITION:  Patient will be discharged home in stable condition.    FOLLOW UP:  [facility], Emergency Dept  [address] [facility] [facility] [address] [address] [phone]    If symptoms worsen    [address], [facility] [address]  [facility] [address]  [phone]  Today        OUTPATIENT MEDICATIONS:  New Prescriptions    No medications on file        FINAL IMPRESSION  1. Drug addiction (CMS-HCC)    2. Malingering    3. Patient's noncompliance with other medical treatment and regimen          INguyễn (Scribe), am scribing for, and in the presence of, Chava Proctor M.D..    Electronically  signed by: Nguyễn Zavala (Scribe), 2/9/2018    IChava M.D. personally performed the services described in this documentation, as scribed by Nguyễn Zavala in my presence, and it is both accurate and complete.    The note accurately reflects work and decisions made by me.  Chava Proctor  2/9/2018  11:51 AM

## 2018-02-09 NOTE — ED TRIAGE NOTES
Marlon Garces  47 y.o.  male  Chief Complaint   Patient presents with   • Detox     Present to triage stated he would like to detox from alcohol and drugs. Last drink yesterday and last drug ( meth ) use 2 days ago. Denies SI /HI.

## 2018-02-09 NOTE — ED TRIAGE NOTES
".  Chief Complaint   Patient presents with   • Anxiety     ./96   Pulse 95   Temp 37.2 °C (99 °F)   Resp 20   Ht 1.676 m (5' 6\")   Wt 86.1 kg (189 lb 13.1 oz)   SpO2 98%   BMI 30.64 kg/m²     Ambulatory to triage with above complaint, denies SI/HI, seen here yesterday and given cab voucher to [facility].  Educated on triage process, placed in lobby, told to inform staff of any changes in condition.   "

## 2018-02-09 NOTE — ED PROVIDER NOTES
"ED Provider Note    Scribed for Dr. Duran Zuleta M.D. by Vignesh Pang. 2/8/2018  10:51 PM    Primary care provider: None.  Means of arrival: walk-in  History obtained from: patient  History limited by: none.    CHIEF COMPLAINT  Chief Complaint   Patient presents with   • Detox       HPI  Marlon Garces is a 47 y.o. male who presents to the Emergency Department for detox from alcohol. Patient states his last drink was yesterday. The patient endorses associated depression. He states his depression has been exacerbated since he stopped drinking, but expresses desire to seek help and resources for detox. He adds he is also feeling generally unwell, but he does not specify any symptoms. Patient confirms a psychiatric history as he has been admitted to [facility] before, but does not specify the condition. The patient denies suicidal ideation.      REVIEW OF SYSTEMS  Pertinent positives include: detox, general malaise. Pertinent negatives include no suicidal ideation.    See HPI for further details. E.    PAST MEDICAL HISTORY   has a past medical history of Psychiatric disorder; Psychiatric disorder; and Psychiatric disorder.    SURGICAL HISTORY  patient denies any surgical history    SOCIAL HISTORY  Social History   Substance Use Topics   • Smoking status: Smoker, Current Status Unknown     Types: Cigarettes   • Smokeless tobacco: None   • Alcohol use Yes, last use yesterday      History   Drug Use   • Types: Methamphetamines       FAMILY HISTORY  No history pertinent to complaint.      CURRENT MEDICATIONS  No current facility-administered medications on file prior to encounter.      No current outpatient prescriptions on file prior to encounter.       ALLERGIES  No Known Allergies    PHYSICAL EXAM  VITAL SIGNS: /93   Pulse (!) 112   Temp 36.2 °C (97.1 °F)   Resp 16   Ht 1.676 m (5' 6\")   Wt 86.8 kg (191 lb 5.8 oz)   SpO2 98%   BMI 30.89 kg/m²     Constitutional: Well developed, Well nourished,no acute distress, " Non-toxic appearance.   HENT: Normocephalic, Atraumatic. Oropharynx moist. Jaw dyskinesia  Eyes: PERRL, EOMI, Conjunctiva normal, No discharge.   Neck: no anterior cervical lymphadenopathy  CV: Good pulses  Thorax & Lungs: No respiratory distress.   Abdomen:  Soft, non-tender.  No rebound, no peritoneal signs.   Skin: Warm, Dry, No erythema, No rash.    Musculoskeletal: No major deformities noted.   Neurologic: Awake, alert. Moves all extremities spontaneously.  Psychiatric: Depressed and tearful.    LABS  Results for orders placed or performed during the hospital encounter of 02/08/18   POC BREATHALIZER   Result Value Ref Range    POC Breathalizer 0.00 0.00 - 0.01 Percent       All labs reviewed by me.      COURSE & MEDICAL DECISION MAKING  Pertinent Labs & Imaging studies reviewed. (See chart for details)    10:51 PM - Patient seen and examined at bedside.Ordered POC breathalizer to evaluate his symptoms. The patient will be seen by life skills. Plan discussed with patient and the patient agreed.     The differential diagnoses include but are not limited to: depression and substance abuse.    11:27 PM - Per life skills RN, there is a bed available at [facility]. Patient will be discharged and sent to [facility] for detox.    Decision Making:  Patient here requesting treatment for alcohol and methamphetamine abuse but will be referred to [facility] after evaluation by life skills     The patient will return for new or worsening symptoms and is stable at the time of discharge.    The patient is referred to a primary physician for blood pressure management, diabetic screening, and for all other preventative health concerns.       DISPOSITION:  Patient will be discharged and sent to [facility] in stable condition.    FOLLOW UP:  No follow-up provider specified.    FINAL IMPRESSION  1. Substance abuse          IVignesh (Scribe), am scribing for, and in the presence of, Duran Zuleta M.D..    Electronically signed  by: Vignesh Pang (Scribe), 2/8/2018    IDuran M.D. personally performed the services described in this documentation, as scribed by Vignesh Pang in my presence, and it is both accurate and complete.    The note accurately reflects work and decisions made by me.  Duran Zuleta  2/9/2018  1:29 AM

## 2018-02-09 NOTE — ED NOTES
"Pt is d/c, vitals taken and stable. Pt refusing to leave, he said \" I need help\" when I asked him why and what kind help he needs. He said \" I am getting old and my body is shutting down\".   Advised pt that 2 doctors already assessed him and medically cleared him. His vitals are stable, no obvious distress noted.   Called security to escort pt.  "

## 2018-02-09 NOTE — ED NOTES
"Pt ambulated to [address] [facility], pt said that [facility] did not accept him because \"they are rude to me\"  "

## 2018-02-09 NOTE — ED NOTES
Pt came in for help with detox from meth, admits to drinking and would also like help with that.   Pt denies pain, si/hi a/v hallucinations.  Hx of depression  Currently taking prozac. vss no acute distress noted.

## 2018-02-09 NOTE — ED NOTES
Pt talked with [facility] was able to get room and detox center, pt given discharge instructions, address.  Walking with steady gait, no acute distress noted.

## 2018-02-09 NOTE — DISCHARGE PLANNING
Medical Social Work  SW asked to assist Pt with transportation to [facility]. [facility] will not pay for ride to [facility].  Cab Voucher [phone] given to Pt.

## 2018-02-09 NOTE — ED NOTES
Asssit nurse: pt discharged, no IV in place and pt given discharged paperwork.  Pt understands discharge paperwork and has Medi mike will be calling a cab to go to [address] per [facility].      Did not take care of this pt just helped with discharge

## 2018-02-09 NOTE — DISCHARGE PLANNING
ALERT team note.  Mr. Garces is requesting detox from alcohol and  Meth.  He is not suicidal or homicidal.  Contacted [facility] who requested that he come over and they would assess him for tx appropriateness with them.  Gave the information to doctor, RN and patient.
